# Patient Record
Sex: MALE | Race: WHITE | NOT HISPANIC OR LATINO | Employment: FULL TIME | URBAN - METROPOLITAN AREA
[De-identification: names, ages, dates, MRNs, and addresses within clinical notes are randomized per-mention and may not be internally consistent; named-entity substitution may affect disease eponyms.]

---

## 2017-07-31 ENCOUNTER — APPOINTMENT (OUTPATIENT)
Dept: RADIOLOGY | Facility: MEDICAL CENTER | Age: 32
End: 2017-07-31
Attending: EMERGENCY MEDICINE
Payer: COMMERCIAL

## 2017-07-31 ENCOUNTER — HOSPITAL ENCOUNTER (EMERGENCY)
Facility: MEDICAL CENTER | Age: 32
End: 2017-07-31
Attending: EMERGENCY MEDICINE
Payer: COMMERCIAL

## 2017-07-31 VITALS
OXYGEN SATURATION: 97 % | BODY MASS INDEX: 44.1 KG/M2 | SYSTOLIC BLOOD PRESSURE: 171 MMHG | WEIGHT: 315 LBS | TEMPERATURE: 99 F | DIASTOLIC BLOOD PRESSURE: 52 MMHG | HEIGHT: 71 IN | HEART RATE: 82 BPM | RESPIRATION RATE: 18 BRPM

## 2017-07-31 DIAGNOSIS — M25.552 ACUTE PAIN OF LEFT HIP: ICD-10-CM

## 2017-07-31 PROCEDURE — 73502 X-RAY EXAM HIP UNI 2-3 VIEWS: CPT | Mod: LT

## 2017-07-31 PROCEDURE — 99284 EMERGENCY DEPT VISIT MOD MDM: CPT

## 2017-07-31 ASSESSMENT — PAIN SCALES - GENERAL: PAINLEVEL_OUTOF10: 7

## 2017-07-31 NOTE — ED AVS SNAPSHOT
SustainU Access Code: [email or web address]  Expires: 8/30/2017  8:10 PM    Your email address is not on file at [facility].  Email Addresses are required for you to sign up for SustainU, please contact [phone] to verify your personal information and to provide your email address prior to attempting to register for SustainU.    Azam Doyle  [address] [place] [address]    SustainU  A secure, online tool to manage your health information     [employer]’s SustainU® is a secure, online tool that connects you to your personalized health information from the privacy of your home -- day or night - making it very easy for you to manage your healthcare. Once the activation process is completed, you can even access your medical information using the SustainU yanni, which is available for free in the Apple Yanni store or Google Play store.     To learn more about SustainU, visit [email or web address]    There are two levels of access available (as shown below):   My Chart Features  [facility] Primary Care Doctor [facility]  Specialists [facility]  Urgent  Care Non-[facility] Primary Care Doctor   Email your healthcare team securely and privately 24/7 X X X    Manage appointments: schedule your next appointment; view details of past/upcoming appointments X      Request prescription refills. X      View recent personal medical records, including lab and immunizations X X X X   View health record, including health history, allergies, medications X X X X   Read reports about your outpatient visits, procedures, consult and ER notes X X X X   See your discharge summary, which is a recap of your hospital and/or ER visit that includes your diagnosis, lab results, and care plan X X  X     How to register for Whit:  Once your e-mail address has been verified, follow the following steps to sign up for Whit.     1. Go to  [email or web address]  2. Click on the Sign Up Now box, which takes you to the New Member  Sign Up page. You will need to provide the following information:  a. Enter your SaySwap Access Code exactly as it appears at the top of this page. (You will not need to use this code after you’ve completed the sign-up process. If you do not sign up before the expiration date, you must request a new code.)   b. Enter your date of birth.   c. Enter your home email address.   d. Click Submit, and follow the next screen’s instructions.  3. Create a SaySwap ID. This will be your SaySwap login ID and cannot be changed, so think of one that is secure and easy to remember.  4. Create a SaySwap password. You can change your password at any time.  5. Enter your Password Reset Question and Answer. This can be used at a later time if you forget your password.   6. Enter your e-mail address. This allows you to receive e-mail notifications when new information is available in SaySwap.  7. Click Sign Up. You can now view your health information.    For assistance activating your SaySwap account, call [phone]

## 2017-07-31 NOTE — ED AVS SNAPSHOT
7/31/2017    Azam Adrian Doyle  [address]  [facility] [address]    Dear Azam:    [facility] wants to ensure your discharge home is safe and you or your loved ones have had all of your questions answered regarding your care after you leave the hospital.    Below is a list of resources and contact information should you have any questions regarding your hospital stay, follow-up instructions, or active medical symptoms.    Questions or Concerns Regarding… Contact   Medical Questions Related to Your Discharge  (7 days a week, 8am-5pm) Contact a Nurse Care Coordinator   [phone]   Medical Questions Not Related to Your Discharge  (24 hours a day / 7 days a week)  Contact the Nurse Health Line   [phone]    Medications or Discharge Instructions Refer to your discharge packet   or contact your [facility] Primary Care Provider   [phone]   Follow-up Appointment(s) Schedule your appointment via Dustcloud   or contact Scheduling [phone]   Billing Review your statement via Dustcloud  or contact Billing [phone]   Medical Records Review your records via Dustcloud   or contact Medical Records [phone]     You may receive a telephone call within two days of discharge. This call is to make certain you understand your discharge instructions and have the opportunity to have any questions answered. You can also easily access your medical information, test results and upcoming appointments via the Dustcloud free online health management tool. You can learn more and sign up at [email or web address]. For assistance setting up your Dustcloud account, please call [phone].    Once again, we want to ensure your discharge home is safe and that you have a clear understanding of any next steps in your care. If you have any questions or concerns, please do not hesitate to contact us, we are here for you. Thank you for choosing [facility] for your healthcare needs.    Sincerely,    Your [facility]  Team

## 2017-07-31 NOTE — ED AVS SNAPSHOT
Home Care Instructions                                                                                                                Azam Doyle   MRN: [id number]    Department:  [facility], Emergency Dept   Date of Visit:  7/31/2017            [facility], Emergency Dept    [address]    [place] [address]    Phone:  [phone]      You were seen by     Tamara Ibanez M.D.      Your Diagnosis Was     Acute pain of left hip     M25.552       Follow-up Information     1. Follow up with [facility], Emergency Dept.    Specialty:  Emergency Medicine    Why:  As needed, If symptoms worsen    Contact information    [address] [address] [phone]        2. Please follow up.    Why:  see her new primary care provider on 8/8 as scheduled      Medication Information     Review all of your home medications and newly ordered medications with your primary doctor and/or pharmacist as soon as possible. Follow medication instructions as directed by your doctor and/or pharmacist.     Please keep your complete medication list with you and share with your physician. Update the information when medications are discontinued, doses are changed, or new medications (including over-the-counter products) are added; and carry medication information at all times in the event of emergency situations.               Medication List      Notice     You have not been prescribed any medications.            Procedures and tests performed during your visit     DX-HIP-COMPLETE - UNILATERAL 2+ LEFT        Discharge Instructions       Use crutches for 5 days and as needed for pain  Take Motrin 600-800 mg 3-4 times daily with food as needed for pain  Apply ice  Follow-up with your new primary care provider as scheduled on 8/8  Return to the ER for fever, increased pain, weakness, other joint pain, or other concerns      Hip  Pain  Your hip is the joint between your upper legs and your lower pelvis. The bones, cartilage, tendons, and muscles of your hip joint perform a lot of work each day supporting your body weight and allowing you to move around.  Hip pain can range from a minor ache to severe pain in one or both of your hips. Pain may be felt on the inside of the hip joint near the groin, or the outside near the buttocks and upper thigh. You may have swelling or stiffness as well.   HOME CARE INSTRUCTIONS   · Take medicines only as directed by your health care provider.  · Apply ice to the injured area:  ¨ Put ice in a plastic bag.  ¨ Place a towel between your skin and the bag.  ¨ Leave the ice on for 15-20 minutes at a time, 3-4 times a day.  · Keep your leg raised (elevated) when possible to lessen swelling.  · Avoid activities that cause pain.  · Follow specific exercises as directed by your health care provider.  · Sleep with a pillow between your legs on your most comfortable side.  · Record how often you have hip pain, the location of the pain, and what it feels like.  SEEK MEDICAL CARE IF:   · You are unable to put weight on your leg.  · Your hip is red or swollen or very tender to touch.  · Your pain or swelling continues or worsens after 1 week.  · You have increasing difficulty walking.  · You have a fever.  SEEK IMMEDIATE MEDICAL CARE IF:   · You have fallen.  · You have a sudden increase in pain and swelling in your hip.  MAKE SURE YOU:   · Understand these instructions.  · Will watch your condition.  · Will get help right away if you are not doing well or get worse.     This information is not intended to replace advice given to you by your health care provider. Make sure you discuss any questions you have with your health care provider.     Document Released: 06/07/2011 Document Revised: 01/08/2016 Document Reviewed: 08/14/2014  [employer] Interactive Patient Education ©2016 Elsevier Inc.            Patient Information       Patient Information    Following emergency treatment: all patient requiring follow-up care must return either to a private physician or a clinic if your condition worsens before you are able to obtain further medical attention, please return to the emergency room.     Billing Information    At [facility], we work to make the billing process streamlined for our patients.  Our Representatives are here to answer any questions you may have regarding your hospital bill.  If you have insurance coverage and have supplied your insurance information to us, we will submit a claim to your insurer on your behalf.  Should you have any questions regarding your bill, we can be reached online or by phone as follows:  Online: You are able pay your bills online or live chat with our representatives about any billing questions you may have. We are here to help Monday - Friday from 8:00am to 7:30pm and 9:00am - 12:00pm on Saturdays.  Please visit [email or web address]  for more information.   Phone:  [phone] or [phone]    Please note that your emergency physician, surgeon, pathologist, radiologist, anesthesiologist, and other specialists are not employed by [facility] and will therefore bill separately for their services.  Please contact them directly for any questions concerning their bills at the numbers below:     Emergency Physician Services:  [phone]  [facility]:  [phone]  [facility]:  [phone]  [facility]:  [phone]    1. Your final bill may vary from the amount quoted upon discharge if all procedures are not complete at that time, or if your doctor has additional procedures of which we are not aware. You will receive an additional bill if you return to the Emergency Department at [facility] for suture removal regardless of the facility of which the sutures were placed.     2. Please arrange for settlement of  this account at the emergency registration.    3. All self-pay accounts are due in full at the time of treatment.  If you are unable to meet this obligation then payment is expected within 4-5 days.     4. If you have had radiology studies (CT, X-ray, Ultrasound, MRI), you have received a preliminary result during your emergency department visit. Please contact the radiology department [phone] to receive a copy of your final result. Please discuss the Final result with your primary physician or with the follow up physician provided.     Crisis Hotline:  [place] Crisis Hotline:  6-351-UNZKWOV or 1-582.562.2365  [place] Crisis Hotline:    1-828.854.6024 or 726-954-9925         ED Discharge Follow Up Questions    1. In order to provide you with very good care, we would like to follow up with a phone call in the next few days.  May we have your permission to contact you?     YES /  NO    2. What is the best phone number to call you? (       )_____-__________    3. What is the best time to call you?      Morning  /  Afternoon  /  Evening                   Patient Signature:  ____________________________________________________________    Date:  ____________________________________________________________

## 2017-08-01 NOTE — DISCHARGE INSTRUCTIONS
Use crutches for 5 days and as needed for pain  Take Motrin 600-800 mg 3-4 times daily with food as needed for pain  Apply ice  Follow-up with your new primary care provider as scheduled on 8/8  Return to the ER for fever, increased pain, weakness, other joint pain, or other concerns      Hip Pain  Your hip is the joint between your upper legs and your lower pelvis. The bones, cartilage, tendons, and muscles of your hip joint perform a lot of work each day supporting your body weight and allowing you to move around.  Hip pain can range from a minor ache to severe pain in one or both of your hips. Pain may be felt on the inside of the hip joint near the groin, or the outside near the buttocks and upper thigh. You may have swelling or stiffness as well.   HOME CARE INSTRUCTIONS   · Take medicines only as directed by your health care provider.  · Apply ice to the injured area:  ¨ Put ice in a plastic bag.  ¨ Place a towel between your skin and the bag.  ¨ Leave the ice on for 15-20 minutes at a time, 3-4 times a day.  · Keep your leg raised (elevated) when possible to lessen swelling.  · Avoid activities that cause pain.  · Follow specific exercises as directed by your health care provider.  · Sleep with a pillow between your legs on your most comfortable side.  · Record how often you have hip pain, the location of the pain, and what it feels like.  SEEK MEDICAL CARE IF:   · You are unable to put weight on your leg.  · Your hip is red or swollen or very tender to touch.  · Your pain or swelling continues or worsens after 1 week.  · You have increasing difficulty walking.  · You have a fever.  SEEK IMMEDIATE MEDICAL CARE IF:   · You have fallen.  · You have a sudden increase in pain and swelling in your hip.  MAKE SURE YOU:   · Understand these instructions.  · Will watch your condition.  · Will get help right away if you are not doing well or get worse.     This information is not intended to replace advice given to  you by your health care provider. Make sure you discuss any questions you have with your health care provider.     Document Released: 06/07/2011 Document Revised: 01/08/2016 Document Reviewed: 08/14/2014  Elsevier Interactive Patient Education ©2016 Elsevier Inc.

## 2017-08-01 NOTE — ED PROVIDER NOTES
"ED Provider Note    CHIEF COMPLAINT  Chief Complaint   Patient presents with   • Hip Pain     L hip pain x 12 days. Pt denies recent injury. In high school pt chipped head of L femur. Pain increases with ambulation.        HPI  Azam Doyle is a 31 y.o. male who presents complaining of left hip pain.    Patient states he had acute onset of left hip pain while at work at [employer] 10 days ago. Patient describes the pain as sharp, nonradiating, 7 out of 10, increased with weightbearing, and decreased with elevating the leg onto a stool. He states he has no pain when he arises in the morning but after 1-2 hours of being on his feet the pain begins again.    Patient denies fever, chills, trauma, low back pain, weakness, numbness.    Patient states he had an injury in high school which was described as a \"chip fracture\"following a fall off a banister.      ALLERGIES  Allergies   Allergen Reactions   • Shellfish Allergy Diarrhea     * pt reports abd pain and diarrhea with any seafood       CURRENT MEDICATIONS  Home Medications     Reviewed by Anastacia Hinson R.N. (Registered Nurse) on 07/31/17 at 1842  Med List Status: Complete    Medication Last Dose Status          Patient Logan Taking any Medications                        PAST MEDICAL HISTORY     Denies    SURGICAL HISTORY  patient denies any surgical history    SOCIAL HISTORY    Works at [employer]      REVIEW OF SYSTEMS  Patient admits to a slight cough yesterday  All other systems are reviewed and negative  See HPI for further details.       PHYSICAL EXAM  VITAL SIGNS: /52 mmHg  Pulse 101  Temp(Src) 37.2 °C (99 °F)  Resp 18  Ht 1.803 m (5' 11\")  Wt 144.6 kg (318 lb 12.6 oz)  BMI 44.48 kg/m2    General:  WD obese, nontoxic appearing in NAD; A+Ox3; V/S as above; tachycardic, hypertensive, afebrile at triage  Skin: warm and dry; good color; no rash  HEENT: NCAT; EOMs intact; PERRL; no scleral icterus   Neck: FROM; " supple  Extremities: MENJIVAR x 4; no e/o trauma; no pedal edema; minimal to moderate tenderness over the left proximal lateral thigh area; no warmth or erythema; pain with abduction of the left hip; no pain with abduction  Neurologic: CNs III-XII grossly intact; speech clear; distal sensation intact; strength 5/5 UE/LEs  Psychiatric: Appropriate affect, normal mood    IMAGING  DX-HIP-COMPLETE - UNILATERAL 2+ LEFT   Final Result      No fracture or dislocation is seen.          MEDICAL RECORD  I have reviewed the patient's medical record and pertinent results as listed.      COURSE & MEDICAL DECISION MAKING  I have reviewed any medical record information, laboratory studies and radiographic results as noted.    Azam Doyle is a 31 y.o. male who presents complaining of left hip pain.  Patient is nervous intact. I do not suspect a septic joint. There is been no trauma. X-ray demonstrates no degenerative joint disease, fracture, or dislocation. Patient will be establishing care with a new PCP on 8/8. I have recommended crutches, NSAIDs, rest, ice and follow-up with a new PCP as scheduled. The patient may need an MRI at that time. Patient was advised to return to the ER for fever, chills, increased pain, other joint pain, or other concerns.      Pt's blood pressure was noted to be above 120/80 here in the ER.  Pt was informed and advised to follow up as an outpatient for recheck for possible dx/management of hypertension.      FINAL IMPRESSION  1. Acute pain of left hip        Electronically signed by: Tamara Ibanez, 7/31/2017 6:47 PM

## 2017-08-01 NOTE — ED NOTES
Pt demonstrated proper crutch following instruction. Will follow up with PCP about hypertension today. Patient verbalized understanding of discharge instructions, no questions at this time. VS stable, patient will ambulate to exit with d/c instructions in hand.

## 2017-08-01 NOTE — ED NOTES
Chief Complaint   Patient presents with   • Hip Pain     L hip pain x 12 days. Pt denies recent injury. In high school pt chipped head of L femur. Pain increases with ambulation.    Ice, heat, tylenol not helping symptoms.

## 2023-05-08 PROBLEM — Z20.3 RABIES CONTACT: Status: ACTIVE | Noted: 2023-05-08
